# Patient Record
Sex: FEMALE | Race: ASIAN | URBAN - METROPOLITAN AREA
[De-identification: names, ages, dates, MRNs, and addresses within clinical notes are randomized per-mention and may not be internally consistent; named-entity substitution may affect disease eponyms.]

---

## 2017-02-06 ENCOUNTER — DOCUMENTATION ONLY (OUTPATIENT)
Dept: TRANSPLANT | Facility: CLINIC | Age: 77
End: 2017-02-06

## 2017-02-09 ENCOUNTER — TRANSFERRED RECORDS (OUTPATIENT)
Dept: HEALTH INFORMATION MANAGEMENT | Facility: CLINIC | Age: 77
End: 2017-02-09

## 2017-02-09 ENCOUNTER — TELEPHONE (OUTPATIENT)
Dept: TRANSPLANT | Facility: CLINIC | Age: 77
End: 2017-02-09

## 2017-02-09 NOTE — TELEPHONE ENCOUNTER
Spoke with Dr. Choi today when he called as he was seeing pt and her  in follow up to her stress test. I explained that I can take the stress test result and Dr. Choi's clinic note from today to the Selection Committee next week for transplant review and to learn next steps.  All in agreement.

## 2017-02-14 ENCOUNTER — MEDICAL CORRESPONDENCE (OUTPATIENT)
Dept: TRANSPLANT | Facility: CLINIC | Age: 77
End: 2017-02-14

## 2017-02-16 ENCOUNTER — TELEPHONE (OUTPATIENT)
Dept: TRANSPLANT | Facility: CLINIC | Age: 77
End: 2017-02-16

## 2017-02-16 NOTE — LETTER
2017    Clarisa Fernández  [id number] [address] [address]  : 1940      Dear Ms. Fernández,   The purpose of this letter is to let you know that on  the [facility] Multi-Disciplinary Selection Team reviewed the results of your transplant evaluation on 02/15/17.   Based on the results of your evaluation and the selection criteria used by our program , the decision was made to not list you on the kidney transplant list.  This is because of your multiple medical diagnoses and appearance of non motivation to complete pre-transplant evaluation successfully making your overall complication risk too high for proceeding with kidney transplant.   Important things you should know:    If you would like to discuss the decision, or if your medical status changes you may schedule a return visits with your doctor by calling [phone] and asking to speak to your transplant coordinator.    We recommend that you continue to follow up with your primary care doctor in order to manage your health concerns.  Enclosed is a letter from [employer] which describes the services offered to patients by [facility] and the [facility].  Thank you for allowing us to participate in your care.  We wish you well.  Sincerely,  Kidney Transplant Team  Enclosure:  OS Letter  CC: Chele Villarreal MD, Bonny Porter MD

## 2017-02-16 NOTE — TELEPHONE ENCOUNTER
Called pt today and spoke with her. Explained the Selection Committee outcome from yesterday declining her as a candidate. Pt expressed good understanding of this and further stated that she is undecided herself about proceeding with transplant. I explained I will send her a letter about this decision and instructed her to call me with questions.     Generated letter in [employer] - routed to  for mailing.